# Patient Record
Sex: FEMALE | Race: WHITE
[De-identification: names, ages, dates, MRNs, and addresses within clinical notes are randomized per-mention and may not be internally consistent; named-entity substitution may affect disease eponyms.]

---

## 2017-10-17 ENCOUNTER — HOSPITAL ENCOUNTER (OUTPATIENT)
Dept: HOSPITAL 75 - RAD | Age: 37
End: 2017-10-17
Attending: NURSE PRACTITIONER
Payer: COMMERCIAL

## 2017-10-17 DIAGNOSIS — N85.2: Primary | ICD-10-CM

## 2017-10-17 PROCEDURE — 76830 TRANSVAGINAL US NON-OB: CPT

## 2017-10-17 PROCEDURE — 76856 US EXAM PELVIC COMPLETE: CPT

## 2017-10-17 NOTE — DIAGNOSTIC IMAGING REPORT
EXAMINATION: Transabdominal and transvaginal pelvic ultrasound.



INDICATION: Menorrhagia. Dysmenorrhea.



FINDINGS:

The uterus is 9.2 x 8.8 x 6.1 cm. It is heterogenous with no

discrete mass identified. The endometrial  stripe is some 6 mm in

thickness. Tiny amount of endometrial fluid is noted.



The left ovary is 3.5 x 2.4 x 1.4 cm. Arterial waveforms are

demonstrated with Doppler evaluation. The right adnexal images

failed to demonstrate the right ovary which could be obscured by

bowel gas.



IMPRESSION:

1. Enlarged heterogenous uterus without discrete mass. Consider

possibility of underlying adenomyosis.

2. The right ovary is not seen.



Dictated by: 



  Dictated on workstation # [id number]

## 2020-07-07 ENCOUNTER — HOSPITAL ENCOUNTER (OUTPATIENT)
Dept: HOSPITAL 75 - RAD | Age: 40
End: 2020-07-07
Attending: NURSE PRACTITIONER
Payer: COMMERCIAL

## 2020-07-07 DIAGNOSIS — N93.8: ICD-10-CM

## 2020-07-07 DIAGNOSIS — Z12.31: Primary | ICD-10-CM

## 2020-07-07 PROCEDURE — 77067 SCR MAMMO BI INCL CAD: CPT

## 2020-07-07 PROCEDURE — 77063 BREAST TOMOSYNTHESIS BI: CPT

## 2020-07-07 PROCEDURE — 76830 TRANSVAGINAL US NON-OB: CPT

## 2020-07-07 PROCEDURE — 76856 US EXAM PELVIC COMPLETE: CPT

## 2020-07-07 NOTE — DIAGNOSTIC IMAGING REPORT
INDICATION: Routine screening.



No prior mammograms are available for comparison. This is a

baseline study.



2-D and 3-D bilateral screening mammography was performed with

CAD.



Both breasts are heterogeneously dense, limiting the sensitivity

of mammography. Densities are identified in both the medial and

lateral aspect of the left breast at anterior depth. These are

best seen on the CC view, appear to be superiorly located on the

MLO view. Additional views recommended. No suspicious

microcalcifications are seen.



IMPRESSION: BI-RADS Category 0.

Left breast densities. Additional views are recommended for

further evaluation.



ACR BI-RADS Category 0: Incomplete. (Needs additional imaging

evaluation).

Result letter will be mailed to the patient.

Note: At least 10% of breast cancer is not imaged by mammography.



Dictated by: 



  Dictated on workstation # [id number]

## 2020-07-07 NOTE — DIAGNOSTIC IMAGING REPORT
PROCEDURE: 

US Non-ob pelvis comp/trans.



INDICATION:  

Pelvic pain.



TECHNIQUE:  

Multiple Real-time grayscale sonographic images were obtained of

the pelvis transabdominally and endovaginally. 



CORRELATION STUDY: 

10/17/2017.



FINDINGS:

UTERUS:  

Mildly enlarged at 11.1 x 7.5 x 8.1 cm. Diffusely heterogeneous

echotexture throughout the myometrium.



ENDOMETRIUM:  

Somewhat indistinct but appears to be likely within normal limits

at approximately 6 mm.  



RIGHT OVARY:  

Not visualized, perhaps obscured by overlying bowel gas and/or

positional.    



LEFT OVARY:  

4.0 x 1.8 x 1.7 cm . Somewhat limited in assessment but overall

generally unremarkable. Blood flow in particular is not well

visualized.  



Small amount of pelvic fluid.



IMPRESSION:

1. Mildly enlarged, heterogeneous appearance about the uterus

without definitive discrete mass. While nonspecific, this may

reflect underlying adenomyosis.



2. Limited visualization of the left ovary but grossly

unremarkable with nonvisualization of the right ovary.



Dictated by: 



  Dictated on workstation # [id number]

## 2020-07-17 ENCOUNTER — HOSPITAL ENCOUNTER (OUTPATIENT)
Dept: HOSPITAL 75 - RAD | Age: 40
End: 2020-07-17
Attending: NURSE PRACTITIONER
Payer: COMMERCIAL

## 2020-07-17 DIAGNOSIS — N60.02: Primary | ICD-10-CM

## 2020-07-17 PROCEDURE — 76642 ULTRASOUND BREAST LIMITED: CPT

## 2020-07-17 PROCEDURE — 77065 DX MAMMO INCL CAD UNI: CPT

## 2020-07-17 NOTE — DIAGNOSTIC IMAGING REPORT
INDICATION: Left breast densities. Patient presents for

additional views.



CORRELATION is made with recent screening study from 07/07/2020.



Unilateral left 2-D and 3-D diagnostic mammography was performed.

This included spot compression CC, rolled CC and conventional 90

degree lateral views.



Additional view shows some persistence of circumscribed nodular

densities in the upper left breast both on the medial and lateral

side 3-5 cm from the nipple. These may represent cysts. No

suspicious calcifications are seen.



IMPRESSION: BI-RADS 0.



Persistent densities in the upper left breast medial and lateral.

Further evaluation with ultrasound is recommended and will be

performed today.



ACR BI-RADS Category 0: Incomplete. (Needs additional imaging

evaluation).

Result letter will be mailed to the patient.

Note: At least 10% of breast cancer is not imaged by mammography.



Dictated by: 



  Dictated on workstation # [id number]

## 2020-07-17 NOTE — DIAGNOSTIC IMAGING REPORT
INDICATION: Left breast densities.



Correlation is made with diagnostic study earlier this same day

and screening mammogram from 07/07/2020.



Sonographic interrogation of the left breast was performed.

Imaging was performed from approximately the 10:00 to 3:00

location. There are numerous cysts present, greatest in the 10:00

region approximately 3 cm from the nipple. Largest is 9 mm x 4 mm

x 8 mm. This likely accounts for the densities noted

mammographically. No solid mass is detected.



IMPRESSION: BI-RADS Category 2



Simple left breast cysts, likely accounting for the mammographic

densities. The patient may return to routine annual screening

mammography.



ACR BI-RADS Category 2: Benign findings.

Result letter will be mailed to the patient.

Note: At least 10% of breast cancer is not imaged by mammography.



Dictated by: 



  Dictated on workstation # [id number]

## 2020-08-13 ENCOUNTER — HOSPITAL ENCOUNTER (OUTPATIENT)
Dept: HOSPITAL 75 - PREOP | Age: 40
Discharge: HOME | End: 2020-08-13
Attending: OBSTETRICS & GYNECOLOGY
Payer: COMMERCIAL

## 2020-08-13 VITALS — WEIGHT: 195.33 LBS | HEIGHT: 66.02 IN | BODY MASS INDEX: 31.39 KG/M2

## 2020-08-13 DIAGNOSIS — Z01.818: Primary | ICD-10-CM

## 2020-08-17 ENCOUNTER — HOSPITAL ENCOUNTER (OUTPATIENT)
Dept: HOSPITAL 75 - SDC | Age: 40
Discharge: HOME | End: 2020-08-17
Attending: OBSTETRICS & GYNECOLOGY
Payer: COMMERCIAL

## 2020-08-17 VITALS — DIASTOLIC BLOOD PRESSURE: 63 MMHG | SYSTOLIC BLOOD PRESSURE: 112 MMHG

## 2020-08-17 VITALS — DIASTOLIC BLOOD PRESSURE: 97 MMHG | SYSTOLIC BLOOD PRESSURE: 135 MMHG

## 2020-08-17 VITALS — SYSTOLIC BLOOD PRESSURE: 116 MMHG | DIASTOLIC BLOOD PRESSURE: 69 MMHG

## 2020-08-17 VITALS — DIASTOLIC BLOOD PRESSURE: 75 MMHG | SYSTOLIC BLOOD PRESSURE: 116 MMHG

## 2020-08-17 VITALS — SYSTOLIC BLOOD PRESSURE: 107 MMHG | DIASTOLIC BLOOD PRESSURE: 65 MMHG

## 2020-08-17 VITALS — BODY MASS INDEX: 31.39 KG/M2 | HEIGHT: 66.02 IN | WEIGHT: 195.33 LBS

## 2020-08-17 VITALS — SYSTOLIC BLOOD PRESSURE: 135 MMHG | DIASTOLIC BLOOD PRESSURE: 97 MMHG

## 2020-08-17 VITALS — SYSTOLIC BLOOD PRESSURE: 112 MMHG | DIASTOLIC BLOOD PRESSURE: 74 MMHG

## 2020-08-17 VITALS — DIASTOLIC BLOOD PRESSURE: 73 MMHG | SYSTOLIC BLOOD PRESSURE: 115 MMHG

## 2020-08-17 VITALS — SYSTOLIC BLOOD PRESSURE: 116 MMHG | DIASTOLIC BLOOD PRESSURE: 71 MMHG

## 2020-08-17 VITALS — SYSTOLIC BLOOD PRESSURE: 127 MMHG | DIASTOLIC BLOOD PRESSURE: 79 MMHG

## 2020-08-17 DIAGNOSIS — E66.9: ICD-10-CM

## 2020-08-17 DIAGNOSIS — N94.6: ICD-10-CM

## 2020-08-17 DIAGNOSIS — Z11.2: ICD-10-CM

## 2020-08-17 DIAGNOSIS — N81.2: ICD-10-CM

## 2020-08-17 DIAGNOSIS — N92.0: ICD-10-CM

## 2020-08-17 DIAGNOSIS — N80.0: Primary | ICD-10-CM

## 2020-08-17 DIAGNOSIS — F17.210: ICD-10-CM

## 2020-08-17 DIAGNOSIS — K21.9: ICD-10-CM

## 2020-08-17 LAB
BASOPHILS # BLD AUTO: 0.1 10^3/UL (ref 0–0.1)
BASOPHILS NFR BLD AUTO: 1 % (ref 0–10)
EOSINOPHIL # BLD AUTO: 0.3 10^3/UL (ref 0–0.3)
EOSINOPHIL NFR BLD AUTO: 3 % (ref 0–10)
ERYTHROCYTE [DISTWIDTH] IN BLOOD BY AUTOMATED COUNT: 14.4 % (ref 10–14.5)
HCT VFR BLD CALC: 45 % (ref 35–52)
HGB BLD-MCNC: 15.1 G/DL (ref 11.5–16)
LYMPHOCYTES # BLD AUTO: 2.6 X 10^3 (ref 1–4)
LYMPHOCYTES NFR BLD AUTO: 26 % (ref 12–44)
MANUAL DIFFERENTIAL PERFORMED BLD QL: NO
MCH RBC QN AUTO: 29 PG (ref 25–34)
MCHC RBC AUTO-ENTMCNC: 33 G/DL (ref 32–36)
MCV RBC AUTO: 88 FL (ref 80–99)
MONOCYTES # BLD AUTO: 1 X 10^3 (ref 0–1)
MONOCYTES NFR BLD AUTO: 10 % (ref 0–12)
NEUTROPHILS # BLD AUTO: 6.1 X 10^3 (ref 1.8–7.8)
NEUTROPHILS NFR BLD AUTO: 61 % (ref 42–75)
PLATELET # BLD: 321 10^3/UL (ref 130–400)
PMV BLD AUTO: 10.7 FL (ref 7.4–10.4)
WBC # BLD AUTO: 10 10^3/UL (ref 4.3–11)

## 2020-08-17 PROCEDURE — 86900 BLOOD TYPING SEROLOGIC ABO: CPT

## 2020-08-17 PROCEDURE — 87081 CULTURE SCREEN ONLY: CPT

## 2020-08-17 PROCEDURE — 86901 BLOOD TYPING SEROLOGIC RH(D): CPT

## 2020-08-17 PROCEDURE — 94664 DEMO&/EVAL PT USE INHALER: CPT

## 2020-08-17 PROCEDURE — 84703 CHORIONIC GONADOTROPIN ASSAY: CPT

## 2020-08-17 PROCEDURE — 85025 COMPLETE CBC W/AUTO DIFF WBC: CPT

## 2020-08-17 PROCEDURE — 88307 TISSUE EXAM BY PATHOLOGIST: CPT

## 2020-08-17 PROCEDURE — 86850 RBC ANTIBODY SCREEN: CPT

## 2020-08-17 PROCEDURE — 36415 COLL VENOUS BLD VENIPUNCTURE: CPT

## 2020-08-17 RX ADMIN — SODIUM CHLORIDE, SODIUM LACTATE, POTASSIUM CHLORIDE, AND CALCIUM CHLORIDE SCH MLS/HR: 600; 310; 30; 20 INJECTION, SOLUTION INTRAVENOUS at 12:32

## 2020-08-17 RX ADMIN — SODIUM CHLORIDE, SODIUM LACTATE, POTASSIUM CHLORIDE, AND CALCIUM CHLORIDE SCH MLS/HR: 600; 310; 30; 20 INJECTION, SOLUTION INTRAVENOUS at 15:16

## 2020-08-17 NOTE — PROGRESS NOTE-PRE OPERATIVE
Pre-Operative Progress Note


H&P Reviewed


The H&P was reviewed, patient examined and no changes noted.


Date Seen by Provider:  Aug 17, 2020


Time Seen by Provider:  09:44


Date H&P Reviewed:  Aug 17, 2020


Time H&P Reviewed:  09:44


Pre-Operative Diagnosis:  Dysmenorrhea, Menorrhagia, POP











ALEXANDRU LEPE DO            Aug 17, 2020 09:44

## 2020-08-17 NOTE — ANESTHESIA-GENERAL POST-OP
General


Patient Condition


Mental Status/LOC:  Same as Preop


Cardiovascular:  Satisfactory


Nausea/Vomiting:  Absent


Respiratory:  Satisfactory


Pain:  Controlled


Complications:  Absent





Post Op Complications


Complications


None





Follow Up Care/Instructions


Patient Instructions


None needed.





Anesthesia/Patient Condition


Patient Condition


Patient is doing well, no complaints, stable vital signs, no apparent adverse 

anesthesia problems.   


No complications reported per nursing.


D/C home per [facility] Criteria:  Yes











NORMAN TOURE CRNA           Aug 17, 2020 13:08

## 2020-08-17 NOTE — NUR
INITIAL ASSESSMENT COMPLETED, SEE INTERVENTIONS FOR DETAILED ASSESSMENTS, PLAN OF CARE 
EXPLAINED, WILL MONITOR CLOSELY.

## 2020-08-17 NOTE — NUR
HETAL ANDERSON demonstrates understanding of discharge instructions and accurately returns 
instructions upon questioning.  Copy of Post-Discharge Instructions and Medication Discharge 
Instructions given to patient. HETAL ANDERSON is able to manage continuing needs after 
discharge.  Patients belongings returned to patient. Skin dry and intact; no breakdown 
noted. Patient discharged from 3303- on 8-17-20 at 1750. HETAL ANDERSON left floor via 
w/c, accompanied by staff.

## 2020-08-17 NOTE — OPERATIVE REPORT
DATE OF SERVICE:  



PREOPERATIVE DIAGNOSES:

1.  A 39-year-old female with menorrhagia.

2.  Dysmenorrhea.

3.  Grade II cystocele with mild pelvic organ prolapse.



POSTOPERATIVE DIAGNOSES:

1.  A 39-year-old female with menorrhagia.

2.  Dysmenorrhea.

3.  Grade II cystocele with mild pelvic organ prolapse.



PROCEDURE:

Robotic-assisted total laparoscopic hysterectomy with bilateral salpingectomy.



SURGEON:

Fabian Sr DO



ASSISTANT:

Christina Souza DNP, was necessary for manipulation and retraction throughout

the procedure.



ANESTHESIA:

General endotracheal.



ESTIMATED BLOOD LOSS:

Minimal.



URINE OUTPUT:

50 mL clear at the end of the procedure.



FLUIDS:

1000 mL lactated Ringer's solution.



FINDINGS:

A hyperemic and bulky appearing uterus, absent of the distal ampullary portion

of fallopian tubes bilaterally.  Grossly normal-appearing ovaries.



SPECIMEN SENT:

Uterus, bilateral fallopian tubes.



INDICATIONS FOR PROCEDURE:

This 39-year-old female patient was a consultation to me from Jelena Hood, our

nurse practitioner, for findings of pelvic organ prolapse as well as history of

heavy bleeding that had failed more conservative management measures in the

past.  Upon reviewing this with the patient in detail in the office, she was

wishing to proceed with more definitive measures and resolution of her bladder

prolapse.  Risks of procedure were discussed with the patient in detail

including risk of bleeding, infection, damaging surrounding structures

including, but not limited to bowel, bladder, ureter, kidneys, possible need for

reoperation, postoperative complications that may occur, recovery timeframe,

risk from anesthesia and possible need for blood transfusion and even death. 

After everything was discussed with the patient in detail, consent was obtained

in the preoperative area, the patient was taken to the operating room.



OPERATIVE REPORT IN DETAIL:

Once in the operating room, general anesthesia was found to be adequate.  She

was placed in dorsal lithotomy position, prepped and draped in normal sterile

fashion.  A timeout was performed.  A weighted speculum was inserted into the

patient's vagina after Cantrell catheter was placed using sterile technique.  A

right angle retractor was used to visualize the cervix.  It was grasped at 12

o'clock position using a long Allis clamp and 0 Vicryl suture was then placed in

the anterior lip of the cervix and this was using my retraction point.  I then

removed the Allis clamp and gently sounded the uterine cavity at a depth of 8

cm.  I selected an 8 cm Tracy uterine manipulator tip and a 3.5 cm colpotomy

ring.  The manipulator tip was advanced into the uterus where the balloon was

deployed and the colpotomy ring was advanced around the vaginal fornix after

which all other instruments were removed from the patient's vagina.  I performed

a change of gloves and took my attention to the abdomen where infraumbilically I

infiltrated this area using 0.25% Marcaine.  I made an 8 mm incision with a

knife and directed a Veress needle through the incision until intraperitoneal

placement was confirmed using saline drop test.  I then proceeded with

insufflation using CO2 gas and opening pressure of 5 mmHg was noted.  I

proceeded to a maximum pressure of 15 mmHg, at which point I removed the Veress

needle and introduced an 8 mm da Sapna camera trocar.  Once this was in place, I

am able to confirm intraperitoneal placement using the da Sapna laparoscope.  A

brief scan of the upper abdominal anatomy appears to be grossly normal.  I had

the patient placed in steep Trendelenburg.  I made to visualize all my pelvic

anatomy findings as described in my findings above.  I then placed two lateral

trocars using both 8 mm trocars approximately 10 cm lateral to my infraumbilical

trocar.  Once these trocars were in place, I brought in the da Sapna robot and

docked in appropriate fashion, placing the vessel sealer in the left hand and

monopolar leana in the right hand, I performed the following dissection

bilaterally.  I started at the uteroovarian ligament, bipolar cauterized and

transected using the vessel sealer.  I then grasped the round ligament, bipolar

cauterized and transected using vessel sealer.  I then am able to transect

through the broad ligament due to the absence of the distal ampullary portion of

the fallopian tube.  I sealed with the vessel sealer and transected using the

vessel sealer as well.  I then took the broad ligament all the way down to the

lower uterine segment, which I  the anterior and posterior leaflets of

the broad ligament, anteriorly was taken to the anterior vaginal fornix,

posterior leaflet was taken around the posterior vaginal fornix.  This allows me

to skeletonize the uterine vessels laterally, which I then bipolar cauterized

and transected using vessel sealer.  I then created a colpotomy at 12 o'clock

position, taken this circumferentially around the vagina, amputating the cervix

from the vagina.  I then removed the entire specimen through the vagina.  We

closed the lateral vaginal apices of the vaginal cuff using 2-0 Vicryl suture in

a figure-of-eight fashion colposuspending the uterosacral ligament.  I then

closed the remainder of the vaginal cuff using 2-0 V-Loc in a running fashion,

after which no active bleeding was noted from any of my dissection planes.  I

then undocked the da Sapna robot and proceeded with remainder of the case

laparoscopically.  I copiously irrigated the pelvis using normal saline.  Once

again, no active bleeding noted from any of my dissection planes.  I placed

FloSeal hemostatic agent over all my planes of dissection, after which the

patient was taken out of steep Trendelenburg.  The lateral trocars were taken

out under direct visualization of laparoscope.  The infraumbilical trocar was

left in place to release insufflation and to introduce 10 mL of 0.25% Marcaine

for postoperative pain management.  I then removed this trocar as well.  The

skin was reapproximated using 4-0 Monocryl in interrupted subcuticular stitches.

 Dermabond was applied to the incisions and Band-Aids were placed over the

incisions as well.  Cantrell catheter was left in place.  Two grams of Ancef, 500

mg of Flagyl were given preoperatively for infection prophylaxis.





Job ID: [id number]

DocumentID: [id number]

Dictated Date:  08/17/2020 12:33:46

Transcription Date: 08/17/2020 20:56:11

Dictated By: FABIAN SR DO

## 2020-08-17 NOTE — XMS REPORT
Continuity of Care Document

                             Created on: 2020



HETAL ANDERSON

External Reference #: [id number]

: 1980

Sex: Female



Demographics





                          Address                   [address] [place]  [address]

 

                          Home Phone                [phone] x

 

                          Preferred Language        Unknown

 

                          Marital Status            Unknown

 

                          Protestant Affiliation     Unknown

 

                          Race                      Unknown

 

                          Ethnic Group              Unknown





Author





                          Author                    [facility]

 

                          Organization              [employer]

 

                          Address                   Unknown

 

                          Phone                     Unavailable



              



Allergies

      



             Active           Description           Code           Type         

  Severity   

                Reaction           Onset           Reported/Identified          

 

Relationship to Patient                 Clinical Status        

 

                Yes             No Known Drug Allergies           [id number]    

       Drug 

Allergy           Mild           N/A                        2009          

 

                                                 



                  



Medications

      



There is no data.                  



Problems

      



             Date Dx Coded           Attending           Type           Code    

       

Diagnosis                               Diagnosed By        

 

             2017           SUDHEER BLACKBURN           Ot           N85.2

           

HYPERTROPHY OF UTERUS                            

 

             2020           SUDHEER BLACKBURN           Ot           N93.8

           

OTHER SPECIFIED ABNORMAL UTERINE AND VAG                    

 

             2020           SUDHEER BLACKBURN           Ot           Z12.3

1           

ENCNTR SCREEN MAMMOGRAM FOR MALIGNANT NE                    

 

             2020           SUDHEER BLACKBURN           Ot           N60.0

2           

SOLITARY CYST OF LEFT BREAST                     

 

             2020           SUDHEER BLACKBURN           Ot           N93.8

           

OTHER SPECIFIED ABNORMAL UTERINE AND VAG                    

 

             2020           SUDHEER BLACKBURN           Ot           Z12.3

1           

ENCNTR SCREEN MAMMOGRAM FOR MALIGNANT NE                    

 

             2020           SUDHEER BLACKBURN           Ot           N60.0

2           

SOLITARY CYST OF LEFT BREAST                     



                              



Procedures

      



There is no data.                  



Results

      



There is no data.              



Encounters

      



                ACCT No.           Visit Date/Time           Discharge          

 Status         

             Pt. Type           Provider           Facility           Loc./Unit 

          

Complaint        

 

                    [id number]           2020 05:33:00           

020 13:06:00        

                DIS             Outpatient           ALEXANDRU LEPE DO       

    [facility]           PREOP                     MENORRHAGIA        

 

                    [id number]           2020 13:07:00            23:59:59        

                CLS             Outpatient           SUDHEER BLACKBURN          

 [facility]           RAD                       ABN MAMMO        

 

                    [id number]           2020 09:23:00           

020 23:59:59        

                CLS             Outpatient           SUDHE[facility] BLACKB[facility]          

 [facility]           RAD                       SCREENING,DYSFUNCTIONAL

 UTERINE 

BLEEDING        

 

                    [id number]           10/17/2017 12:58:00           10/17/2

017 23:59:59        

                CLS             Outpatient           SUDHEER BLACKBURN          

 [facility]           RAD                       MENORRHAGIA N92.0      

  

 

             [id number]           2020 10:15:00                        P

ALEXANDRU López DO           [facility]                       MENORRHAGIA

## 2020-08-17 NOTE — DISCHARGE INST-WOMEN'S SERVICE
Discharge Inst-Women's Serv


Depart Medication/Instructions


New, Converted or Re-Newed RX:  RX on Chart


Problems Reviewed?:  Yes





Consults/Follow Up


Additional Follow Up:  Yes





Activity


Activity:  Activity as Tolerated


Driving Instructions:  No Driving for 1 Week


NO SMOKING:  NO SMOKING


Nothing Inside Vagina:  No Douching, No [place], No Tampons





Diet


Discharge Diet:  No Restrictions


Symptoms to Report to :  Bleeding Excessive, Pain Increased, Fever Over 101 

Degrees F, Vaginal Bleeding Increase, Questions/Concerns


For Any Problems or Questions:  Contact Your Physician





Skin/Wound Care


Infection Signs and Symptoms:  Increased Redness, Foul Odor of Wound, Increased 

Drainage, Skin Itchy or Has a Rash, Increased Swelling, Temperature Above 101  F


Operative Area Clean and Dry:  Keep Incision Clean/Dry


Stitches/Staples/Dermabond:  Dermabond, Care of Stitches


Bathing Instructions:  ALEXANDRU Rodriguez DO            Aug 17, 2020 09:53

## 2020-08-18 NOTE — ANESTHESIA-GENERAL POST-OP
General


Patient Condition


Mental Status/LOC:  Same as Preop


Cardiovascular:  Satisfactory


Nausea/Vomiting:  Absent


Respiratory:  Satisfactory


Pain:  Controlled


Complications:  Absent





Post Op Complications


Complications


None





Follow Up Care/Instructions


Patient Instructions


None needed.





Anesthesia/Patient Condition


Patient Condition


Patient is doing well, no complaints, stable vital signs, no apparent adverse 

anesthesia problems.   


No complications reported per nursing.


D/C home per [facility] Criteria:  Yes











AUSTIN IRWIN CRNA            Aug 18, 2020 08:02